# Patient Record
Sex: MALE | Race: OTHER
[De-identification: names, ages, dates, MRNs, and addresses within clinical notes are randomized per-mention and may not be internally consistent; named-entity substitution may affect disease eponyms.]

---

## 2018-12-26 ENCOUNTER — HOSPITAL ENCOUNTER (OUTPATIENT)
Dept: HOSPITAL 53 - M ADAMS | Age: 33
End: 2018-12-26
Attending: PHYSICIAN ASSISTANT
Payer: COMMERCIAL

## 2018-12-26 DIAGNOSIS — R10.811: Primary | ICD-10-CM

## 2018-12-26 LAB
ALBUMIN SERPL BCG-MCNC: 3.8 GM/DL (ref 3.2–5.2)
ALT SERPL W P-5'-P-CCNC: 32 U/L (ref 12–78)
BASOPHILS # BLD AUTO: 0 10^3/UL (ref 0–0.2)
BASOPHILS NFR BLD AUTO: 0.6 % (ref 0–1)
BILIRUB SERPL-MCNC: 0.4 MG/DL (ref 0.2–1)
BUN SERPL-MCNC: 20 MG/DL (ref 7–18)
CALCIUM SERPL-MCNC: 9.4 MG/DL (ref 8.5–10.1)
CHLORIDE SERPL-SCNC: 104 MEQ/L (ref 98–107)
CO2 SERPL-SCNC: 31 MEQ/L (ref 21–32)
CREAT SERPL-MCNC: 0.94 MG/DL (ref 0.7–1.3)
EOSINOPHIL # BLD AUTO: 0.1 10^3/UL (ref 0–0.5)
EOSINOPHIL NFR BLD AUTO: 1.1 % (ref 0–3)
GFR SERPL CREATININE-BSD FRML MDRD: > 60 ML/MIN/{1.73_M2} (ref 60–?)
GLUCOSE SERPL-MCNC: 90 MG/DL (ref 70–100)
HCT VFR BLD AUTO: 46.5 % (ref 42–52)
HGB BLD-MCNC: 15.6 G/DL (ref 13.5–17.5)
LIPASE SERPL-CCNC: 142 U/L (ref 73–393)
LYMPHOCYTES # BLD AUTO: 2.5 10^3/UL (ref 1.5–4.5)
LYMPHOCYTES NFR BLD AUTO: 35.6 % (ref 24–44)
MCH RBC QN AUTO: 28.3 PG (ref 27–33)
MCHC RBC AUTO-ENTMCNC: 33.5 G/DL (ref 32–36.5)
MCV RBC AUTO: 84.4 FL (ref 80–96)
MONOCYTES # BLD AUTO: 0.7 10^3/UL (ref 0–0.8)
MONOCYTES NFR BLD AUTO: 9.7 % (ref 0–5)
NEUTROPHILS # BLD AUTO: 3.7 10^3/UL (ref 1.8–7.7)
NEUTROPHILS NFR BLD AUTO: 52.9 % (ref 36–66)
PLATELET # BLD AUTO: 194 10^3/UL (ref 150–450)
POTASSIUM SERPL-SCNC: 4.7 MEQ/L (ref 3.5–5.1)
PROT SERPL-MCNC: 7.5 GM/DL (ref 6.4–8.2)
RBC # BLD AUTO: 5.51 10^6/UL (ref 4.3–6.1)
SODIUM SERPL-SCNC: 140 MEQ/L (ref 136–145)
WBC # BLD AUTO: 7 10^3/UL (ref 4–10)

## 2018-12-27 ENCOUNTER — HOSPITAL ENCOUNTER (OUTPATIENT)
Dept: HOSPITAL 53 - M RAD | Age: 33
End: 2018-12-27
Attending: PHYSICIAN ASSISTANT
Payer: COMMERCIAL

## 2018-12-27 DIAGNOSIS — R10.811: Primary | ICD-10-CM

## 2018-12-27 NOTE — REP
Clinical: Right upper quadrant abdominal pain.

 

Technique:  Gray scale ultrasound using curved array transducer.

 

Findings:  The liver and pancreas are normal in contour, size, and echogenicity

without focal hepatic or pancreatic lesions identified.  The gallbladder is

normal without gallstones, wall thickening or pericholecystic fluid.  No biliary

ductal dilatation is appreciated, and the common bile duct measures 3.4 mm

diameter.  The right kidney is normal in reniform shape without hydronephrosis

and measures 10.9 x 7.4 x 4.5 cm.  No ascites. Visualized portions of the

abdominal aorta normal.

 

Impression:

Normal right upper quadrant and gallbladder abdominal ultrasound.

 

 

Electronically Signed by

Jason Garcia MD 12/27/2018 07:50 A

## 2018-12-28 ENCOUNTER — HOSPITAL ENCOUNTER (OUTPATIENT)
Dept: HOSPITAL 53 - M LAB REF | Age: 33
End: 2018-12-28
Attending: PHYSICIAN ASSISTANT
Payer: COMMERCIAL

## 2018-12-28 DIAGNOSIS — R10.9: Primary | ICD-10-CM

## 2019-01-04 ENCOUNTER — HOSPITAL ENCOUNTER (OUTPATIENT)
Dept: HOSPITAL 53 - M SFHCADAM | Age: 34
End: 2019-01-04
Attending: FAMILY MEDICINE
Payer: COMMERCIAL

## 2019-01-04 DIAGNOSIS — R10.10: Primary | ICD-10-CM

## 2019-01-04 LAB
BUN SERPL-MCNC: 23 MG/DL (ref 7–18)
CALCIUM SERPL-MCNC: 8.8 MG/DL (ref 8.5–10.1)
CHLORIDE SERPL-SCNC: 103 MEQ/L (ref 98–107)
CO2 SERPL-SCNC: 29 MEQ/L (ref 21–32)
CREAT SERPL-MCNC: 0.99 MG/DL (ref 0.7–1.3)
GFR SERPL CREATININE-BSD FRML MDRD: > 60 ML/MIN/{1.73_M2} (ref 60–?)
GLUCOSE SERPL-MCNC: 76 MG/DL (ref 70–100)
POTASSIUM SERPL-SCNC: 4.3 MEQ/L (ref 3.5–5.1)
SODIUM SERPL-SCNC: 139 MEQ/L (ref 136–145)

## 2019-01-07 ENCOUNTER — HOSPITAL ENCOUNTER (OUTPATIENT)
Dept: HOSPITAL 53 - M RAD | Age: 34
End: 2019-01-07
Attending: FAMILY MEDICINE
Payer: COMMERCIAL

## 2019-01-07 DIAGNOSIS — R10.10: Primary | ICD-10-CM

## 2019-01-07 PROCEDURE — 74177 CT ABD & PELVIS W/CONTRAST: CPT

## 2019-01-09 NOTE — REP
Clinical:  Upper abdominal pain.

 

Technique:  Axial contrast enhanced images from the lung bases to the pubic

symphysis using oral (per protocol) and 100 ml Isovue 370 intravenous contrast

material with coronal and sagittal re-formations.

 

Comparison:  None.

 

Findings:

Lung bases are clear.  Visualized heart and pericardium normal.

 

Liver, spleen, pancreas, gallbladder, bilateral adrenal glands and kidneys are

normal.  The enteric system is without obstruction or acute inflammatory process.

Normal terminal ileum and appendix are identified in the right lower quadrant;

incidental 3 mm appendicolith at the base of the appendix identified.  Pelvis

demonstrates normal bladder and age appropriate prostate/seminal vesicles.  No

ascites.  No free air.  No adenopathy.  Abdominal aorta and vasculature normal.

Musculoskeletal structures are intact.

 

Impression:

Essentially normal CT of the abdomen and pelvis.

 

 

Electronically Signed by

Jason Garcia MD 01/09/2019 08:21 A

## 2019-01-25 ENCOUNTER — HOSPITAL ENCOUNTER (OUTPATIENT)
Dept: HOSPITAL 53 - M RAD | Age: 34
End: 2019-01-25
Attending: FAMILY MEDICINE
Payer: COMMERCIAL

## 2019-01-25 DIAGNOSIS — R10.10: Primary | ICD-10-CM

## 2019-01-25 NOTE — REP
HIDA SCAN WITH GALLBLADDER EJECTION FRACTION:

 

Following the intravenous administration of 6.4 millicuries technetium 99m

Mebrofenin, multiple images of the right upper quadrant are performed every 5

minutes for a period of 1 hour.  The gallbladder is visualized at 15 minutes post

injection.  There is biliary to bowel transit at 30 minutes post injection with

no scintigraphic evidence of cholecystis.

 

At the 1-hour katharine 8 ounces of Ensure Enlive is ingested and further imaging

performed for 1 hour.  Gallbladder activity is measures.  Gallbladder ejection

fraction is calculated to be 94%, which is normal.

 

IMPRESSION:

 

No scintigraphic evidence of cholecystitis.  Normal gallbladder ejection

fraction.

 

 

Electronically Signed by

Mumtaz Church MD 01/25/2019 11:45 A

## 2021-10-05 ENCOUNTER — HOSPITAL ENCOUNTER (OUTPATIENT)
Dept: HOSPITAL 53 - M SFHCADAM | Age: 36
End: 2021-10-05
Attending: FAMILY MEDICINE
Payer: COMMERCIAL

## 2021-10-05 DIAGNOSIS — Z00.00: Primary | ICD-10-CM

## 2021-10-05 DIAGNOSIS — R10.11: ICD-10-CM

## 2021-10-05 LAB
ALBUMIN SERPL BCG-MCNC: 3.8 GM/DL (ref 3.2–5.2)
ALT SERPL W P-5'-P-CCNC: 44 U/L (ref 12–78)
AMYLASE SERPL-CCNC: 63 U/L (ref 25–115)
BASOPHILS # BLD AUTO: 0 10^3/UL (ref 0–0.2)
BASOPHILS NFR BLD AUTO: 0.6 % (ref 0–1)
BILIRUB SERPL-MCNC: 0.4 MG/DL (ref 0.2–1)
BUN SERPL-MCNC: 19 MG/DL (ref 7–18)
CALCIUM SERPL-MCNC: 9.8 MG/DL (ref 8.5–10.1)
CHLORIDE SERPL-SCNC: 106 MEQ/L (ref 98–107)
CHOLEST SERPL-MCNC: 262 MG/DL (ref ?–200)
CHOLEST/HDLC SERPL: 4.16 {RATIO} (ref ?–5)
CO2 SERPL-SCNC: 30 MEQ/L (ref 21–32)
CREAT SERPL-MCNC: 1.07 MG/DL (ref 0.7–1.3)
EOSINOPHIL # BLD AUTO: 0.1 10^3/UL (ref 0–0.5)
EOSINOPHIL NFR BLD AUTO: 0.9 % (ref 0–3)
GFR SERPL CREATININE-BSD FRML MDRD: > 60 ML/MIN/{1.73_M2} (ref 60–?)
GLUCOSE SERPL-MCNC: 92 MG/DL (ref 70–100)
HCT VFR BLD AUTO: 49.5 % (ref 42–52)
HDLC SERPL-MCNC: 63 MG/DL (ref 40–?)
HGB BLD-MCNC: 16 G/DL (ref 13.5–17.5)
LDLC SERPL CALC-MCNC: 165 MG/DL (ref ?–100)
LIPASE SERPL-CCNC: 105 U/L (ref 73–393)
LYMPHOCYTES # BLD AUTO: 2.8 10^3/UL (ref 1.5–5)
LYMPHOCYTES NFR BLD AUTO: 41.3 % (ref 24–44)
MCH RBC QN AUTO: 27.7 PG (ref 27–33)
MCHC RBC AUTO-ENTMCNC: 32.3 G/DL (ref 32–36.5)
MCV RBC AUTO: 85.8 FL (ref 80–96)
MONOCYTES # BLD AUTO: 0.5 10^3/UL (ref 0–0.8)
MONOCYTES NFR BLD AUTO: 7.2 % (ref 2–8)
NEUTROPHILS # BLD AUTO: 3.3 10^3/UL (ref 1.5–8.5)
NEUTROPHILS NFR BLD AUTO: 49.9 % (ref 36–66)
NONHDLC SERPL-MCNC: 199 MG/DL
PLATELET # BLD AUTO: 238 10^3/UL (ref 150–450)
POTASSIUM SERPL-SCNC: 4.5 MEQ/L (ref 3.5–5.1)
PROT SERPL-MCNC: 7.4 GM/DL (ref 6.4–8.2)
RBC # BLD AUTO: 5.77 10^6/UL (ref 4.3–6.1)
SODIUM SERPL-SCNC: 139 MEQ/L (ref 136–145)
TRIGL SERPL-MCNC: 171 MG/DL (ref ?–150)
TSH SERPL DL<=0.005 MIU/L-ACNC: 0.73 UIU/ML (ref 0.36–3.74)
WBC # BLD AUTO: 6.7 10^3/UL (ref 4–10)

## 2021-10-12 ENCOUNTER — HOSPITAL ENCOUNTER (OUTPATIENT)
Dept: HOSPITAL 53 - M RAD | Age: 36
End: 2021-10-12
Attending: FAMILY MEDICINE
Payer: COMMERCIAL

## 2021-10-12 DIAGNOSIS — N28.1: ICD-10-CM

## 2021-10-12 DIAGNOSIS — R10.11: Primary | ICD-10-CM

## 2021-10-12 NOTE — REP
INDICATION:

RUQ PAIN



COMPARISON:

12/27/2018



TECHNIQUE:

Real time gray scale ultrasound examination using curved array transducer.



FINDINGS:

Liver is normal in contour, size, and echogenicity without focal hepatic lesions

identified.

Pancreas is incompletely evaluated due to interposed bowel gas.



The gallbladder is normal and without gallstones, wall thickening, or pericholecystic

fluid.  No biliary ductal dilatation is appreciated and the common bile duct measures

3.9 mm diameter.



Right kidney is normal in reniform shape without hydronephrosis and measures 11.9 x

6.5 x 4.9 cm and includes subcentimeter cyst.



No ascites in the visualized right upper quadrant.







IMPRESSION:

Essentially normal right upper quadrant ultrasound.





<Electronically signed by Jason Garcia > 10/12/21 [id number]

## 2022-03-28 ENCOUNTER — HOSPITAL ENCOUNTER (OUTPATIENT)
Dept: HOSPITAL 53 - M PLALAB | Age: 37
End: 2022-03-28
Attending: INTERNAL MEDICINE
Payer: COMMERCIAL

## 2022-03-28 ENCOUNTER — HOSPITAL ENCOUNTER (OUTPATIENT)
Dept: HOSPITAL 53 - M LABSMTC | Age: 37
End: 2022-03-28
Attending: ANESTHESIOLOGY
Payer: COMMERCIAL

## 2022-03-28 DIAGNOSIS — R10.11: Primary | ICD-10-CM

## 2022-03-28 DIAGNOSIS — Z20.822: ICD-10-CM

## 2022-03-28 DIAGNOSIS — Z11.52: Primary | ICD-10-CM

## 2022-04-01 ENCOUNTER — HOSPITAL ENCOUNTER (OUTPATIENT)
Dept: HOSPITAL 53 - M OPP | Age: 37
Discharge: HOME | End: 2022-04-01
Attending: INTERNAL MEDICINE
Payer: COMMERCIAL

## 2022-04-01 VITALS — SYSTOLIC BLOOD PRESSURE: 127 MMHG | DIASTOLIC BLOOD PRESSURE: 73 MMHG

## 2022-04-01 VITALS — WEIGHT: 171 LBS | BODY MASS INDEX: 25.33 KG/M2 | HEIGHT: 69 IN

## 2022-04-01 DIAGNOSIS — R10.84: ICD-10-CM

## 2022-04-01 DIAGNOSIS — K29.70: ICD-10-CM

## 2022-04-01 DIAGNOSIS — Z88.1: ICD-10-CM

## 2022-04-01 DIAGNOSIS — K63.5: Primary | ICD-10-CM

## 2022-04-01 DIAGNOSIS — R14.0: ICD-10-CM

## 2022-04-01 DIAGNOSIS — K52.89: ICD-10-CM

## 2022-04-01 DIAGNOSIS — R19.4: ICD-10-CM

## 2022-04-01 DIAGNOSIS — R19.7: ICD-10-CM

## 2022-04-01 DIAGNOSIS — Z79.899: ICD-10-CM

## 2024-12-17 ENCOUNTER — HOSPITAL ENCOUNTER (OUTPATIENT)
Dept: HOSPITAL 53 - M SFHCADAM | Age: 39
End: 2024-12-17
Attending: FAMILY MEDICINE
Payer: COMMERCIAL

## 2024-12-17 DIAGNOSIS — Z00.00: Primary | ICD-10-CM

## 2024-12-17 LAB
ALBUMIN SERPL BCG-MCNC: 3.7 G/DL (ref 3.2–5.2)
ALP SERPL-CCNC: 72 U/L (ref 40–129)
ALT SERPL W P-5'-P-CCNC: 31 U/L (ref 7–40)
AST SERPL-CCNC: 19 U/L (ref ?–34)
BASOPHILS # BLD AUTO: 0 10^3/UL (ref 0–0.2)
BASOPHILS NFR BLD AUTO: 0.7 % (ref 0–1)
BILIRUB SERPL-MCNC: 0.7 MG/DL (ref 0.3–1.2)
BUN SERPL-MCNC: 25 MG/DL (ref 9–23)
CALCIUM SERPL-MCNC: 9.8 MG/DL (ref 8.5–10.1)
CHLORIDE SERPL-SCNC: 106 MMOL/L (ref 98–107)
CHOLEST SERPL-MCNC: 252 MG/DL (ref ?–200)
CHOLEST/HDLC SERPL: 4.02 {RATIO} (ref ?–5)
CO2 SERPL-SCNC: 30 MMOL/L (ref 20–31)
CREAT SERPL-MCNC: 0.98 MG/DL (ref 0.7–1.3)
EOSINOPHIL # BLD AUTO: 0.1 10^3/UL (ref 0–0.5)
EOSINOPHIL NFR BLD AUTO: 1.8 % (ref 0–3)
GFR SERPL CREATININE-BSD FRML MDRD: > 60 ML/MIN/{1.73_M2} (ref 60–?)
GLUCOSE SERPL-MCNC: 94 MG/DL (ref 60–100)
HCT VFR BLD AUTO: 49.4 % (ref 42–52)
HDLC SERPL-MCNC: 62.6 MG/DL (ref 40–?)
HGB BLD-MCNC: 16 G/DL (ref 13.5–17.5)
LDLC SERPL CALC-MCNC: 167 MG/DL (ref ?–100)
LYMPHOCYTES # BLD AUTO: 2.3 10^3/UL (ref 1.5–5)
LYMPHOCYTES NFR BLD AUTO: 40.4 % (ref 24–44)
MCH RBC QN AUTO: 28.6 PG (ref 27–33)
MCHC RBC AUTO-ENTMCNC: 32.4 G/DL (ref 32–36.5)
MCV RBC AUTO: 88.4 FL (ref 80–96)
MONOCYTES # BLD AUTO: 0.5 10^3/UL (ref 0–0.8)
MONOCYTES NFR BLD AUTO: 8.3 % (ref 2–8)
NEUTROPHILS # BLD AUTO: 2.7 10^3/UL (ref 1.5–8.5)
NEUTROPHILS NFR BLD AUTO: 48.6 % (ref 36–66)
NONHDLC SERPL-MCNC: 189.4 MG/DL
PLATELET # BLD AUTO: 205 10^3/UL (ref 150–450)
POTASSIUM SERPL-SCNC: 4.4 MMOL/L (ref 3.5–5.1)
PROT SERPL-MCNC: 7 G/DL (ref 5.7–8.2)
RBC # BLD AUTO: 5.59 10^6/UL (ref 4.3–6.1)
SODIUM SERPL-SCNC: 142 MMOL/L (ref 136–145)
TRIGL SERPL-MCNC: 112 MG/DL (ref ?–150)
WBC # BLD AUTO: 5.6 10^3/UL (ref 4–10)

## 2024-12-22 ENCOUNTER — HOSPITAL ENCOUNTER (OUTPATIENT)
Dept: HOSPITAL 53 - M LAB REF | Age: 39
End: 2024-12-22
Attending: REGISTERED NURSE
Payer: COMMERCIAL

## 2024-12-22 DIAGNOSIS — J06.9: Primary | ICD-10-CM

## 2024-12-23 ENCOUNTER — HOSPITAL ENCOUNTER (OUTPATIENT)
Dept: HOSPITAL 53 - M ADAMS | Age: 39
End: 2024-12-23
Attending: REGISTERED NURSE
Payer: COMMERCIAL

## 2024-12-23 DIAGNOSIS — J06.9: Primary | ICD-10-CM
